# Patient Record
Sex: MALE | Race: WHITE | NOT HISPANIC OR LATINO | Employment: FULL TIME | URBAN - METROPOLITAN AREA
[De-identification: names, ages, dates, MRNs, and addresses within clinical notes are randomized per-mention and may not be internally consistent; named-entity substitution may affect disease eponyms.]

---

## 2021-09-27 PROCEDURE — 87635 SARS-COV-2 COVID-19 AMP PRB: CPT | Performed by: NURSE PRACTITIONER

## 2021-09-28 ENCOUNTER — TELEPHONE (OUTPATIENT)
Dept: URGENT CARE | Facility: CLINIC | Age: 46
End: 2021-09-28

## 2022-03-18 PROCEDURE — U0004 COV-19 TEST NON-CDC HGH THRU: HCPCS | Performed by: PHYSICIAN ASSISTANT

## 2022-03-19 ENCOUNTER — TELEPHONE (OUTPATIENT)
Dept: URGENT CARE | Facility: CLINIC | Age: 47
End: 2022-03-19

## 2022-03-19 NOTE — TELEPHONE ENCOUNTER
----- Message from RONNIE Crow sent at 3/19/2022  9:27 AM EDT -----  Please call the patient regarding his negative Covid result. Advise to follow up with primary care as needed.

## 2023-02-15 ENCOUNTER — PROCEDURE VISIT (OUTPATIENT)
Dept: NEUROLOGY | Facility: CLINIC | Age: 48
End: 2023-02-15
Payer: OTHER GOVERNMENT

## 2023-02-15 VITALS
HEART RATE: 101 BPM | BODY MASS INDEX: 39.9 KG/M2 | WEIGHT: 285 LBS | HEIGHT: 71 IN | SYSTOLIC BLOOD PRESSURE: 130 MMHG | DIASTOLIC BLOOD PRESSURE: 80 MMHG

## 2023-02-15 DIAGNOSIS — G56.03 BILATERAL CARPAL TUNNEL SYNDROME: Primary | ICD-10-CM

## 2023-02-15 PROCEDURE — 99203 OFFICE O/P NEW LOW 30 MIN: CPT | Performed by: PSYCHIATRY & NEUROLOGY

## 2023-02-15 PROCEDURE — 95910 NRV CNDJ TEST 7-8 STUDIES: CPT | Performed by: PSYCHIATRY & NEUROLOGY

## 2023-02-15 RX ORDER — ALBUTEROL SULFATE 90 UG/1
2 AEROSOL, METERED RESPIRATORY (INHALATION) EVERY 4 HOURS PRN
COMMUNITY
End: 2023-03-30

## 2023-02-15 RX ORDER — SILDENAFIL 100 MG/1
100 TABLET, FILM COATED ORAL DAILY PRN
COMMUNITY

## 2023-02-15 RX ORDER — SEMAGLUTIDE 2.4 MG/.75ML
2.4 INJECTION, SOLUTION SUBCUTANEOUS TAKE AS DIRECTED
COMMUNITY
Start: 2023-01-09

## 2023-02-15 NOTE — PROGRESS NOTES
"Chief Complaint  Numbness (NUMBNESS & TINGLING BUE, R>L)    Subjective          Jill Nichols is a 47 y.o. male who presents to [facility]  History of Present Illness  47-year-old man evaluated for right hand numbness and left hand numbness.  He is here for nerve conduction study.  He states that he had carpal tunnel surgery to the right hand in 2021 and it improved his symptoms tremendously.  He still gets numbness in his right hand intermittently at least 3-4 times a week at night when he sleeping the wrong way and during the daytime at least twice a week.  He states that the numbness is in the distribution of the pinky and ring finger and he wanted to know if this was secondary to an ulnar nerve that is pinched in his right elbow.  He states that he also gets numbness in his thumb, index and middle finger when he is sleeping similar to what he had prior to the surgery.  Prior to the carpal tunnel surgery in 2021 it would go all the way up to his elbow.    He is also getting numbness and tingling in the left hand involving the thumb, index and middle finger and half of the ring finger.  He is getting injections to his wrist and it usually will last for about 3 months and then it recurs again.  Left hand is worse than the right hand at this time.  He has not had carpal tunnel surgery to the left hand.    He also had an EMG/nerve conduction study that was done in [place] in 2017 and he was told that he had a pinched nerve in his neck.  He had an MRI of his cervical spine a year later.  He does not recall the findings.  Objective   Vital Signs:   /80   Pulse 101   Ht 179.1 cm (70.51\")   Wt 129 kg (285 lb)   BMI 40.30 kg/m²     Physical Exam   There is no weakness of the upper extremity individual muscle testing.  Phalen sign is positive in the left hand.  Tinel's sign is negative in both elbows.        Assessment and Plan  Diagnoses and all orders for this " visit:    1. Bilateral carpal tunnel syndrome (Primary)  Assessment & Plan:  Nerve conduction study is abnormal and shows electrophysiologic evidence for mild bilateral carpal tunnel syndrome.  It is slightly worse on the left side.  I discussed with him that his symptoms are secondary to carpal tunnel syndrome and not ulnar neuropathy at the elbow.    I would recommend for him to buy bilateral wrist splints and wear it nightly the next several weeks until [facility] [place]  He is to inform them if there is significant improvement of his hand numbness and tingling at night.  I discussed with him that he does not have clinical ulnar neuropathy at the right elbow however the wrist splinting is not effective for him after several weeks I would recommend for him to buy a right elbow brace and I showed him a [place] brace on [email or web address].  He is to wear it nightly.  He is also to avoid leaning his elbows and avoid prolonged bending of his elbows.  I discussed with him that I would not recommend any type of surgical procedure performed in his right elbow.         Nerve Conduction Study:  8 nerves     EMG:  Not performed    Total time spent with the patient and coordinating patient care was 30 minutes.    Follow Up  No follow-ups on file.  Patient was given instructions and counseling regarding his condition or for health maintenance advice. Please see specific information pulled into the AVS if appropriate.

## 2023-02-15 NOTE — ASSESSMENT & PLAN NOTE
Nerve conduction study is abnormal and shows electrophysiologic evidence for mild bilateral carpal tunnel syndrome.  It is slightly worse on the left side.  I discussed with him that his symptoms are secondary to carpal tunnel syndrome and not ulnar neuropathy at the elbow.    I would recommend for him to buy bilateral wrist splints and wear it nightly the next several weeks until he [facility] [place].  He is to inform them if there is significant improvement of his hand numbness and tingling at night.  I discussed with him that he does not have clinical ulnar neuropathy at the right elbow however the wrist splinting is not effective for him after several weeks I would recommend for him to buy a right elbow brace and I showed him a [place] brace on [email or web address].  He is to wear it nightly.  He is also to avoid leaning his elbows and avoid prolonged bending of his elbows.  I discussed with him that I would not recommend any type of surgical procedure performed in his right elbow.

## 2023-06-12 ENCOUNTER — CLINICAL SUPPORT (OUTPATIENT)
Dept: GENETICS | Facility: HOSPITAL | Age: 48
End: 2023-06-12
Payer: OTHER GOVERNMENT

## 2023-06-12 DIAGNOSIS — D17.9 MULTIPLE LIPOMAS: ICD-10-CM

## 2023-06-12 DIAGNOSIS — K63.5 POLYP OF COLON, UNSPECIFIED PART OF COLON, UNSPECIFIED TYPE: ICD-10-CM

## 2023-06-12 DIAGNOSIS — Z13.79 GENETIC TESTING: Primary | ICD-10-CM

## 2023-06-12 PROCEDURE — 96040: CPT | Performed by: GENETIC COUNSELOR, MS
